# Patient Record
Sex: MALE | Race: OTHER
[De-identification: names, ages, dates, MRNs, and addresses within clinical notes are randomized per-mention and may not be internally consistent; named-entity substitution may affect disease eponyms.]

---

## 2020-10-25 ENCOUNTER — HOSPITAL ENCOUNTER (EMERGENCY)
Dept: HOSPITAL 72 - EMR | Age: 20
Discharge: HOME | End: 2020-10-25
Payer: SELF-PAY

## 2020-10-25 VITALS — SYSTOLIC BLOOD PRESSURE: 130 MMHG | DIASTOLIC BLOOD PRESSURE: 78 MMHG

## 2020-10-25 VITALS — DIASTOLIC BLOOD PRESSURE: 63 MMHG | SYSTOLIC BLOOD PRESSURE: 116 MMHG

## 2020-10-25 VITALS — BODY MASS INDEX: 27.28 KG/M2 | WEIGHT: 180 LBS | HEIGHT: 68 IN

## 2020-10-25 DIAGNOSIS — J45.41: Primary | ICD-10-CM

## 2020-10-25 DIAGNOSIS — N28.9: ICD-10-CM

## 2020-10-25 DIAGNOSIS — D72.19: ICD-10-CM

## 2020-10-25 LAB
ADD MANUAL DIFF: NO
ALBUMIN SERPL-MCNC: 4.2 G/DL (ref 3.4–5)
ALBUMIN/GLOB SERPL: 1.3 {RATIO} (ref 1–2.7)
ALP SERPL-CCNC: 108 U/L (ref 46–116)
ALT SERPL-CCNC: 26 U/L (ref 12–78)
ANION GAP SERPL CALC-SCNC: 5 MMOL/L (ref 5–15)
AST SERPL-CCNC: 15 U/L (ref 15–37)
BASOPHILS NFR BLD AUTO: 1.9 % (ref 0–2)
BILIRUB SERPL-MCNC: 0.2 MG/DL (ref 0.2–1)
BUN SERPL-MCNC: 16 MG/DL (ref 7–18)
CALCIUM SERPL-MCNC: 8.9 MG/DL (ref 8.5–10.1)
CHLORIDE SERPL-SCNC: 104 MMOL/L (ref 98–107)
CO2 SERPL-SCNC: 29 MMOL/L (ref 21–32)
CREAT SERPL-MCNC: 1.6 MG/DL (ref 0.55–1.3)
EOSINOPHIL NFR BLD AUTO: 7.5 % (ref 0–3)
ERYTHROCYTE [DISTWIDTH] IN BLOOD BY AUTOMATED COUNT: 12.5 % (ref 11.6–14.8)
GLOBULIN SER-MCNC: 3.2 G/DL
HCT VFR BLD CALC: 47.7 % (ref 42–52)
HGB BLD-MCNC: 16.2 G/DL (ref 14.2–18)
LYMPHOCYTES NFR BLD AUTO: 32.3 % (ref 20–45)
MCV RBC AUTO: 92 FL (ref 80–99)
MONOCYTES NFR BLD AUTO: 5.8 % (ref 1–10)
NEUTROPHILS NFR BLD AUTO: 52.5 % (ref 45–75)
PLATELET # BLD: 245 K/UL (ref 150–450)
POTASSIUM SERPL-SCNC: 3.8 MMOL/L (ref 3.5–5.1)
RBC # BLD AUTO: 5.16 M/UL (ref 4.7–6.1)
SODIUM SERPL-SCNC: 138 MMOL/L (ref 136–145)
WBC # BLD AUTO: 8.9 K/UL (ref 4.8–10.8)

## 2020-10-25 PROCEDURE — 85025 COMPLETE CBC W/AUTO DIFF WBC: CPT

## 2020-10-25 PROCEDURE — 96372 THER/PROPH/DIAG INJ SC/IM: CPT

## 2020-10-25 PROCEDURE — 99284 EMERGENCY DEPT VISIT MOD MDM: CPT

## 2020-10-25 PROCEDURE — 36415 COLL VENOUS BLD VENIPUNCTURE: CPT

## 2020-10-25 PROCEDURE — 83735 ASSAY OF MAGNESIUM: CPT

## 2020-10-25 PROCEDURE — 96374 THER/PROPH/DIAG INJ IV PUSH: CPT

## 2020-10-25 PROCEDURE — 94640 AIRWAY INHALATION TREATMENT: CPT

## 2020-10-25 PROCEDURE — 80053 COMPREHEN METABOLIC PANEL: CPT

## 2020-10-25 PROCEDURE — 71045 X-RAY EXAM CHEST 1 VIEW: CPT

## 2020-10-25 RX ADMIN — ALBUTEROL SULFATE SCH MG: 2.5 SOLUTION RESPIRATORY (INHALATION) at 23:19

## 2020-10-25 RX ADMIN — ALBUTEROL SULFATE SCH MG: 2.5 SOLUTION RESPIRATORY (INHALATION) at 22:59

## 2020-10-25 NOTE — NUR
ED Nurse Note:

Pt from home with c/o sob with asthma x 2 days, no fevers or other s/s, pt has 
been out with friends last night. out of home asthma meds. changed into gown; 
attached to monitor. all safety measures met.

## 2020-10-25 NOTE — EMERGENCY ROOM REPORT
History of Present Illness


General


Chief Complaint:  Dyspnea/Respdistress


Source:  Patient





Present Illness


HPI


Patient presents with exacerbation of asthma.  This has been going on for 

several days.  He ran out of his inhaler today.  It is severe but not the worst 

attack he is ever had.  He has had prednisone in the past.  He was intubated 

when he was young.  He denies chest pain.  He denies fevers or chills.  He 

denies productive phlegm of any color.  There is no nausea, vomiting or 

diarrhea.  He denies joint pain.  He denies dizziness.





The patient denies exposure to Covid positive contacts.





No sore throat, chest pain, palpitations, dysuria, abdominal pain, joint pain, 

rashes, depression, anxiety, visual changes, dizziness, headache.


Allergies:  


Coded Allergies:  


     No Known Allergies (Unverified , 10/25/20)





COVID-19 Screening


Contact w/high risk pt:  No


Experienced COVID-19 symptoms?:  Yes


COVID-19 Testing performed PTA:  No





Patient History


Past Medical History:  see triage record


Social History:  Denies: smoking


Social History Narrative


From home


Reviewed Nursing Documentation:  PMH: Agreed; PSxH: Agreed





Nursing Documentation-PMH


Hx Asthma:  Yes





Review of Systems


All Other Systems:  negative except mentioned in HPI





Physical Exam





Vital Signs








  Date Time  Temp Pulse Resp B/P (MAP) Pulse Ox O2 Delivery O2 Flow Rate FiO2


 


10/25/20 21:11 98.1 113 20 130/78 (95) 90 Room Air  








Sp02 EP Interpretation:  reviewed, abnormal - Interpreted as low by me


General Appearance:  well appearing, alert, GCS 15, non-toxic, mild distress


Head:  normocephalic


Eyes:  bilateral eye normal inspection, bilateral eye PERRL, bilateral eye EOMI


ENT:  normal pharynx, moist mucus membranes


Neck:  supple


Respiratory:  respiratory distress - Mild, wheezing, expiration, inspiration


Cardiovascular #1:  regular rate, rhythm, no edema


Cardiovascular #2:  2+ radial (R)


Gastrointestinal:  normal inspection, normal bowel sounds, non tender, no mass, 

non-distended


Musculoskeletal:  back normal, normal range of motion, gait/station normal


Neurologic:  alert, oriented x3, grossly normal


Psychiatric:  mood/affect normal


Skin:  no rash, warm/dry





Medical Decision Making


Diagnostic Impression:  


   Primary Impression:  


   Asthma exacerbation


   Qualified Codes:  J45.41 - Moderate persistent asthma with (acute) exacerbat

   ion


   Additional Impressions:  


   Renal insufficiency


   Eosinophilia


   Qualified Codes:  D72.19 - Other eosinophilia


ER Course


Patient presents with several days of worsening wheezing.  Differential includes

exacerbation of asthma, pneumonia, COVID-19, bronchitis amongst others.  Patient

evaluated with chest x-ray and labs.  COVID-19 needs to be tested as breathing 

treatments are indicated.  As breathing treatments are not available IM 

epinephrine is administered.  In addition IV Solu-Medrol will be administered.  

The patient has had severe asthma in the past but this is not his worst attack. 

Patient is placed on a cardiac monitor.





Chest x-ray no infiltrates.  Labs with normal white count.  Eosinophilia.  CMP 

remarkable for elevated creatinine.





Improved after IM epinephrine.  Covid negative and breathing treatments institu

sabrina.





After 2 treatments the patient requests no more hand-held nebulizer treatments 

as he feels fine at this time.





Valeria 2330





Discussed findings with patient and treatment plan.  Patient improved with 

treatment at this time and stable for outpatient observation and treatment.





Laboratory Tests








Test


 10/25/20


21:30


 


White Blood Count


 8.9 K/UL


(4.8-10.8)


 


Red Blood Count


 5.16 M/UL


(4.70-6.10)


 


Hemoglobin


 16.2 G/DL


(14.2-18.0)


 


Hematocrit


 47.7 %


(42.0-52.0)


 


Mean Corpuscular Volume 92 FL (80-99)  


 


Mean Corpuscular Hemoglobin


 31.3 PG


(27.0-31.0)  H


 


Mean Corpuscular Hemoglobin


Concent 33.9 G/DL


(32.0-36.0)


 


Red Cell Distribution Width


 12.5 %


(11.6-14.8)


 


Platelet Count


 245 K/UL


(150-450)


 


Mean Platelet Volume


 8.3 FL


(6.5-10.1)


 


Neutrophils (%) (Auto)


 52.5 %


(45.0-75.0)


 


Lymphocytes (%) (Auto)


 32.3 %


(20.0-45.0)


 


Monocytes (%) (Auto)


 5.8 %


(1.0-10.0)


 


Eosinophils (%) (Auto)


 7.5 %


(0.0-3.0)  H


 


Basophils (%) (Auto)


 1.9 %


(0.0-2.0)


 


Sodium Level


 138 MMOL/L


(136-145)


 


Potassium Level


 3.8 MMOL/L


(3.5-5.1)


 


Chloride Level


 104 MMOL/L


()


 


Carbon Dioxide Level


 29 MMOL/L


(21-32)


 


Anion Gap


 5 mmol/L


(5-15)


 


Blood Urea Nitrogen


 16 mg/dL


(7-18)


 


Creatinine


 1.6 MG/DL


(0.55-1.30)  H


 


Estimated Glomerular


Filtration Rate 56.0 mL/min


(>60)


 


Glucose Level


 100 MG/DL


()


 


Calcium Level


 8.9 MG/DL


(8.5-10.1)


 


Magnesium Level


 2.1 MG/DL


(1.8-2.4)


 


Total Bilirubin


 0.2 MG/DL


(0.2-1.0)


 


Aspartate Amino Transferase


(AST) 15 U/L (15-37)





 


Alanine Aminotransferase (ALT)


 26 U/L (12-78)





 


Alkaline Phosphatase


 108 U/L


()


 


Total Protein


 7.4 G/DL


(6.4-8.2)


 


Albumin


 4.2 G/DL


(3.4-5.0)


 


Globulin 3.2 g/dL  


 


Albumin/Globulin Ratio 1.3 (1.0-2.7)  








Microbiology








 Date/Time


Source Procedure


Growth Status





 


 10/25/20 21:30


Nasopharynx SARS-CoV-2 RdRp Gene Assay - Final Complete








Rhythm Strip Diag. Results


EP Interpretation:  yes


Rhythm:  NSR, no PVC's, no ectopy





Chest X-Ray Diagnostic Results


Chest X-Ray Diagnostic Results :  


   Chest X-Ray Ordered:  Yes


   # of Views/Limited/Complete:  1 View


   Indication:  Shortness of Breath


   EP Interpretation:  Yes


   Interpretation:  no consolidation, no effusion, no pneumothorax


   Impression:  No acute disease


   Electronically Signed by:  Electronically signed by Toño Acosta MD





Last Vital Signs








  Date Time  Temp Pulse Resp B/P (MAP) Pulse Ox O2 Delivery O2 Flow Rate FiO2


 


10/25/20 23:45 98.1 99 20 116/63 99 Room Air  21








Status:  improved


Disposition:  HOME, SELF-CARE


Condition:  Improved


Scripts


Prednisone* (PREDNISONE*) 20 Mg Tablet


40 MG ORAL DAILY, #10 TAB


   Prov: Toño Acosta MD         10/25/20 


Albuterol Sulfate* (Albuterol Sulfate Hfa*) 8.5 Gm Hfa.aer.ad


2 PUFF INH Q6H, #1 INH 1 Refill


   Prov: Toño Acosta MD         10/25/20


Referrals:  


NOT CHOSEN [facility]/MD,REFERRING (PCP)











Toño Acosta MD                Oct 25, 2020 21:28

## 2020-10-25 NOTE — DIAGNOSTIC IMAGING REPORT
EXAM:

  XR Chest, 1 View

 

CLINICAL HISTORY:

  DYSPNEA

 

TECHNIQUE:

  Frontal view of the chest.

 

COMPARISON:

  No relevant prior studies available.

 

FINDINGS:

  

Normal AP portable chest radiograph.

## 2020-12-30 ENCOUNTER — HOSPITAL ENCOUNTER (EMERGENCY)
Dept: HOSPITAL 72 - EMR | Age: 20
Discharge: HOME | End: 2020-12-30
Payer: MEDICAID

## 2020-12-30 VITALS — SYSTOLIC BLOOD PRESSURE: 138 MMHG | DIASTOLIC BLOOD PRESSURE: 86 MMHG

## 2020-12-30 VITALS — HEIGHT: 68 IN | WEIGHT: 200 LBS | BODY MASS INDEX: 30.31 KG/M2

## 2020-12-30 VITALS — SYSTOLIC BLOOD PRESSURE: 128 MMHG | DIASTOLIC BLOOD PRESSURE: 85 MMHG

## 2020-12-30 DIAGNOSIS — Z79.899: ICD-10-CM

## 2020-12-30 DIAGNOSIS — J45.901: Primary | ICD-10-CM

## 2020-12-30 PROCEDURE — 94640 AIRWAY INHALATION TREATMENT: CPT

## 2020-12-30 PROCEDURE — 99283 EMERGENCY DEPT VISIT LOW MDM: CPT

## 2020-12-30 PROCEDURE — 71045 X-RAY EXAM CHEST 1 VIEW: CPT

## 2020-12-30 NOTE — DIAGNOSTIC IMAGING REPORT
Indication: Shortness of breath

 

Technique: One view of the chest

 

Comparison: none

 

Findings: Lungs and pleural spaces are clear. Heart size is normal. No significant

change

 

Impression: No acute process

## 2020-12-30 NOTE — EMERGENCY ROOM REPORT
History of Present Illness


General


Chief Complaint:  Asthma


Source:  Patient





Present Illness


HPI


Patient is a 20-year-old male presents for increased difficulty with breathing. 

Prior history of asthma.  Had no recent fever.  Reports having increased cough 

with increased phlegm production.  Denies any vomiting or diarrhea.  No recent 

fevers.  No pain or swelling to his legs.  Had run out of his inhaler.  Had di

fficulty breathing had onset last night.


Allergies:  


Coded Allergies:  


     No Known Allergies (Unverified , 10/25/20)





COVID-19 Screening


Contact w/high risk pt:  No


Experienced COVID-19 symptoms?:  No


COVID-19 Testing performed PTA:  Yes - 10/2020


COVID-19 Screening:  Negative COVID-19


COVID[place]19 Testing Source[place]  [facility] [place]





Patient History


Past Medical History:  see triage record


Reviewed Nursing Documentation:  PMH: Agreed; PSxH: Agreed





Nursing Documentation-PMH


Past Medical History:  No History, Except For


Hx Asthma:  Yes





Review of Systems


All Other Systems:  negative except mentioned in HPI





Physical Exam





Vital Signs








  Date Time  Temp Pulse Resp B/P (MAP) Pulse Ox O2 Delivery O2 Flow Rate FiO2


 


12/30/20 11:58 98.1 130 18 147/84 (105) 100 Room Air  








Sp02 EP Interpretation:  reviewed, normal


General Appearance:  normal inspection, alert, GCS 15, mild distress


Head:  atraumatic


ENT:  normal ENT inspection, hearing grossly normal, normal voice


Neck:  normal inspection, full range of motion, supple, no bony tend


Respiratory:  normal inspection, no respiratory distress, no retraction, 

wheezing


Cardiovascular #1:  regular rate, rhythm, no edema


Gastrointestinal:  normal inspection, normal bowel sounds, non tender, soft, no 

guarding, no hernia


Genitourinary:  no CVA tenderness


Musculoskeletal:  normal inspection, back normal, normal range of motion


Neurologic:  alert, motor strength/tone normal, CNs III-XII nml as tested, 

oriented x3, responsive, speech normal, normal inspection


Psychiatric:  normal inspection, judgement/insight normal, mood/affect normal





Medical Decision Making


Diagnostic Impression:  


   Primary Impression:  


   Asthma exacerbation


ER Course


Patient presented for shortness of breath.   Differential diagnosis include was 

not limited to coronavirus infection, pneumonia, among others.  Patient has a 

benign exam and does not appear to require any imaging or laboratory testing at 

this time.Patient was given oral steroids as well as breathing treatment.  

Coronavirus testing was ordered to patient's need for breathing treatment was 

negative.  Patient was noted have improvement in air movement as well as 

respiratory difficulty after breathing treatment.  He stated he felt better.  

Patient given prescription for albuterol as well as oral steroids.  He was 

advised to return if any worsening condition or other concerns.  He was advised 

to follow-up with his doctor for recheck in 1 to 2 days.  This medical record is

generated with Dragon transcription software. There may be some transcription 

discrepancies related to use of this software





Last Vital Signs








  Date Time  Temp Pulse Resp B/P (MAP) Pulse Ox O2 Delivery O2 Flow Rate FiO2


 


12/30/20 11:58 98.1 130 18 147/84 (105) 100 Room Air  








Status:  improved


Disposition:  HOME, SELF-CARE


Condition:  Stable


Scripts


Prednisone* (PREDNISONE*) 20 Mg Tablet


40 MG ORAL DAILY, #10 TAB


   Prov: Keny Paul MD         12/30/20 


Albuterol Sulfate* (Albuterol Sulfate Hfa*) 8.5 Gm Hfa.aer.ad


2 PUFF INH Q6H, #1 INH 1 Refill


   Prov: Keny Paul MD         12/30/20











Keny Paul MD               Dec 30, 2020 12:07

## 2020-12-30 NOTE — NUR
ED Nurse Note:



Patient from home and walked in due to SOB started last night and patient ran 
out of his inhaler. History of asthma. AAO x4, ambulatory with non labored 
breathing.